# Patient Record
Sex: MALE | Race: OTHER | Employment: FULL TIME | URBAN - METROPOLITAN AREA
[De-identification: names, ages, dates, MRNs, and addresses within clinical notes are randomized per-mention and may not be internally consistent; named-entity substitution may affect disease eponyms.]

---

## 2024-08-18 ENCOUNTER — HOSPITAL ENCOUNTER (EMERGENCY)
Age: 24
Discharge: HOME | End: 2024-08-18

## 2024-08-18 VITALS
DIASTOLIC BLOOD PRESSURE: 64 MMHG | SYSTOLIC BLOOD PRESSURE: 138 MMHG | HEIGHT: 65 IN | TEMPERATURE: 98.6 F | HEART RATE: 63 BPM | WEIGHT: 124.2 LBS | RESPIRATION RATE: 16 BRPM | BODY MASS INDEX: 20.69 KG/M2 | OXYGEN SATURATION: 100 %

## 2024-08-18 DIAGNOSIS — R10.32 LEFT INGUINAL PAIN: Primary | ICD-10-CM

## 2024-08-18 PROCEDURE — 99283 EMERGENCY DEPT VISIT LOW MDM: CPT

## 2024-08-18 RX ORDER — NAPROXEN 500 MG/1
500 TABLET ORAL 2 TIMES DAILY PRN
Qty: 20 TABLET | Refills: 0 | Status: SHIPPED | OUTPATIENT
Start: 2024-08-18

## 2024-08-18 ASSESSMENT — PAIN DESCRIPTION - ORIENTATION: ORIENTATION: LEFT

## 2024-08-18 ASSESSMENT — PAIN DESCRIPTION - LOCATION: LOCATION: GROIN

## 2024-08-18 ASSESSMENT — LIFESTYLE VARIABLES
HOW MANY STANDARD DRINKS CONTAINING ALCOHOL DO YOU HAVE ON A TYPICAL DAY: PATIENT DOES NOT DRINK
HOW OFTEN DO YOU HAVE A DRINK CONTAINING ALCOHOL: NEVER

## 2024-08-18 ASSESSMENT — PAIN SCALES - GENERAL: PAINLEVEL_OUTOF10: 6

## 2024-08-18 ASSESSMENT — PAIN - FUNCTIONAL ASSESSMENT: PAIN_FUNCTIONAL_ASSESSMENT: 0-10

## 2024-08-19 NOTE — ED NOTES
Pt discharged to home. 1 prescription given, education provided. Will follow up as directed. Verbalized understanding of AVS. Pt awake, alert and oriented. Respirations even and unlabored on room air.

## 2024-08-19 NOTE — ED PROVIDER NOTES
swelling, abdominal pain, vomiting, diarrhea, constipation, prior abdominal hernia or surgery.  Denies dysuria, discharge, lesions.    On exam, vitals nonemergent, nontoxic-appearing, no distress.  Abdominal exam nonsurgical.  No testicular involvement, low concern for acute traumatic injury to the testes, torsion.  No evidence of obstruction on history or physical.  Left inguinal exam nonemergent, very small nodule without signs of complication.  Given NSAID for pain, instructed to ice area, rest, follow-up with primary care, return for any new or worsening symptoms    Disposition Considerations (tests considered but not done, Admit vs D/C, Shared Decision Making, Pt Expectation of Test or Tx.):        I am the Primary Clinician of Record.  FINAL IMPRESSION      1. Left inguinal pain          DISPOSITION/PLAN     DISPOSITION Decision To Discharge 08/18/2024 11:22:27 PM  Condition at Disposition: Stable      PATIENT REFERRED TO:  [facility]  [phone]  Call in 2 days        DISCHARGE MEDICATIONS:  Discharge Medication List as of 8/18/2024 11:32 PM        START taking these medications    Details   naproxen (NAPROSYN) 500 MG tablet Take 1 tablet by mouth 2 times daily as needed for Pain, Disp-20 tablet, R-0Print             DISCONTINUED MEDICATIONS:  Discharge Medication List as of 8/18/2024 11:32 PM                 (Please note that portions of this note were completed with a voice recognition program.  Efforts were made to edit the dictations but occasionally words are mis-transcribed.)    Juanito Tomlin PA-C (electronically signed)            Juanito Tomlin PA-C  08/18/24 [id number]